# Patient Record
Sex: FEMALE | Race: WHITE | NOT HISPANIC OR LATINO | URBAN - METROPOLITAN AREA
[De-identification: names, ages, dates, MRNs, and addresses within clinical notes are randomized per-mention and may not be internally consistent; named-entity substitution may affect disease eponyms.]

---

## 2018-05-12 ENCOUNTER — EMERGENCY (EMERGENCY)
Facility: HOSPITAL | Age: 53
LOS: 0 days | Discharge: HOME | End: 2018-05-12
Attending: EMERGENCY MEDICINE | Admitting: EMERGENCY MEDICINE
Payer: COMMERCIAL

## 2018-05-12 VITALS
RESPIRATION RATE: 16 BRPM | HEART RATE: 94 BPM | TEMPERATURE: 98 F | SYSTOLIC BLOOD PRESSURE: 128 MMHG | DIASTOLIC BLOOD PRESSURE: 74 MMHG | OXYGEN SATURATION: 99 %

## 2018-05-12 VITALS
DIASTOLIC BLOOD PRESSURE: 79 MMHG | HEIGHT: 66 IN | OXYGEN SATURATION: 96 % | RESPIRATION RATE: 18 BRPM | SYSTOLIC BLOOD PRESSURE: 130 MMHG | WEIGHT: 139.99 LBS | TEMPERATURE: 98 F | HEART RATE: 108 BPM

## 2018-05-12 DIAGNOSIS — L25.9 UNSPECIFIED CONTACT DERMATITIS, UNSPECIFIED CAUSE: ICD-10-CM

## 2018-05-12 DIAGNOSIS — L03.115 CELLULITIS OF RIGHT LOWER LIMB: ICD-10-CM

## 2018-05-12 DIAGNOSIS — M79.9 SOFT TISSUE DISORDER, UNSPECIFIED: ICD-10-CM

## 2018-05-12 LAB
ALBUMIN SERPL ELPH-MCNC: 4 G/DL — SIGNIFICANT CHANGE UP (ref 3.3–5)
ALP SERPL-CCNC: 59 U/L — SIGNIFICANT CHANGE UP (ref 40–120)
ALT FLD-CCNC: 28 U/L — SIGNIFICANT CHANGE UP (ref 12–78)
ANION GAP SERPL CALC-SCNC: 9 MMOL/L — SIGNIFICANT CHANGE UP (ref 5–17)
AST SERPL-CCNC: 24 U/L — SIGNIFICANT CHANGE UP (ref 15–37)
BASOPHILS # BLD AUTO: 0.05 K/UL — SIGNIFICANT CHANGE UP (ref 0–0.2)
BASOPHILS NFR BLD AUTO: 0.7 % — SIGNIFICANT CHANGE UP (ref 0–2)
BILIRUB SERPL-MCNC: 0.7 MG/DL — SIGNIFICANT CHANGE UP (ref 0.2–1.2)
BUN SERPL-MCNC: 23 MG/DL — SIGNIFICANT CHANGE UP (ref 7–23)
CALCIUM SERPL-MCNC: 8.7 MG/DL — SIGNIFICANT CHANGE UP (ref 8.5–10.1)
CHLORIDE SERPL-SCNC: 104 MMOL/L — SIGNIFICANT CHANGE UP (ref 96–108)
CO2 SERPL-SCNC: 26 MMOL/L — SIGNIFICANT CHANGE UP (ref 22–31)
CREAT SERPL-MCNC: 1.12 MG/DL — SIGNIFICANT CHANGE UP (ref 0.5–1.3)
EOSINOPHIL # BLD AUTO: 0.26 K/UL — SIGNIFICANT CHANGE UP (ref 0–0.5)
EOSINOPHIL NFR BLD AUTO: 3.5 % — SIGNIFICANT CHANGE UP (ref 0–6)
GLUCOSE SERPL-MCNC: 84 MG/DL — SIGNIFICANT CHANGE UP (ref 70–99)
HCT VFR BLD CALC: 41.1 % — SIGNIFICANT CHANGE UP (ref 34.5–45)
HGB BLD-MCNC: 13.5 G/DL — SIGNIFICANT CHANGE UP (ref 11.5–15.5)
IMM GRANULOCYTES NFR BLD AUTO: 0.4 % — SIGNIFICANT CHANGE UP (ref 0–1.5)
LYMPHOCYTES # BLD AUTO: 1.43 K/UL — SIGNIFICANT CHANGE UP (ref 1–3.3)
LYMPHOCYTES # BLD AUTO: 19.2 % — SIGNIFICANT CHANGE UP (ref 13–44)
MCHC RBC-ENTMCNC: 30.7 PG — SIGNIFICANT CHANGE UP (ref 27–34)
MCHC RBC-ENTMCNC: 32.8 GM/DL — SIGNIFICANT CHANGE UP (ref 32–36)
MCV RBC AUTO: 93.4 FL — SIGNIFICANT CHANGE UP (ref 80–100)
MONOCYTES # BLD AUTO: 0.58 K/UL — SIGNIFICANT CHANGE UP (ref 0–0.9)
MONOCYTES NFR BLD AUTO: 7.8 % — SIGNIFICANT CHANGE UP (ref 2–14)
NEUTROPHILS # BLD AUTO: 5.1 K/UL — SIGNIFICANT CHANGE UP (ref 1.8–7.4)
NEUTROPHILS NFR BLD AUTO: 68.4 % — SIGNIFICANT CHANGE UP (ref 43–77)
NRBC # BLD: 0 /100 WBCS — SIGNIFICANT CHANGE UP (ref 0–0)
PLATELET # BLD AUTO: 247 K/UL — SIGNIFICANT CHANGE UP (ref 150–400)
POTASSIUM SERPL-MCNC: 3.8 MMOL/L — SIGNIFICANT CHANGE UP (ref 3.5–5.3)
POTASSIUM SERPL-SCNC: 3.8 MMOL/L — SIGNIFICANT CHANGE UP (ref 3.5–5.3)
PROT SERPL-MCNC: 7 GM/DL — SIGNIFICANT CHANGE UP (ref 6–8.3)
RBC # BLD: 4.4 M/UL — SIGNIFICANT CHANGE UP (ref 3.8–5.2)
RBC # FLD: 13.2 % — SIGNIFICANT CHANGE UP (ref 10.3–14.5)
SODIUM SERPL-SCNC: 139 MMOL/L — SIGNIFICANT CHANGE UP (ref 135–145)
WBC # BLD: 7.45 K/UL — SIGNIFICANT CHANGE UP (ref 3.8–10.5)
WBC # FLD AUTO: 7.45 K/UL — SIGNIFICANT CHANGE UP (ref 3.8–10.5)

## 2018-05-12 PROCEDURE — 99285 EMERGENCY DEPT VISIT HI MDM: CPT

## 2018-05-12 RX ADMIN — Medication 100 MILLIGRAM(S): at 09:28

## 2018-05-12 NOTE — ED ADULT NURSE NOTE - OBJECTIVE STATEMENT
Pt with 2 wk old poison oak, with cellulitis since Wednesday. Rash and redness on both thighs and arms.

## 2018-05-12 NOTE — ED ADULT TRIAGE NOTE - CHIEF COMPLAINT QUOTE
Pt reports poison ivy/poison oak first noticed approx a week and a half ago. Pt reports that she has developed cellulitis on RLE with some possible cellulitis on left thigh.  Pt told to come to ED if symptoms worsened, with spreading redness today.

## 2018-05-28 ENCOUNTER — TRANSCRIPTION ENCOUNTER (OUTPATIENT)
Age: 53
End: 2018-05-28

## 2020-03-22 NOTE — ED PROVIDER NOTE - OBJECTIVE STATEMENT
53 y/o F with no pertinent PMHx presents to the ED c/o worsening redness and swelling on RLE and left thigh. These areas has been previously exposed to poison ivy a week prior. Pt was started on Bactrim yesterday for secondary infection of contact dermatitis from two weeks ago.
No

## 2022-05-21 ENCOUNTER — NON-APPOINTMENT (OUTPATIENT)
Age: 57
End: 2022-05-21

## 2022-08-31 PROBLEM — Z00.00 ENCOUNTER FOR PREVENTIVE HEALTH EXAMINATION: Status: ACTIVE | Noted: 2022-08-31

## 2022-09-14 ENCOUNTER — APPOINTMENT (OUTPATIENT)
Dept: ORTHOPEDIC SURGERY | Facility: CLINIC | Age: 57
End: 2022-09-14

## 2022-09-14 ENCOUNTER — TRANSCRIPTION ENCOUNTER (OUTPATIENT)
Age: 57
End: 2022-09-14

## 2022-09-14 VITALS — BODY MASS INDEX: 22.5 KG/M2 | HEIGHT: 66 IN | WEIGHT: 140 LBS

## 2022-09-14 DIAGNOSIS — Z78.9 OTHER SPECIFIED HEALTH STATUS: ICD-10-CM

## 2022-09-14 DIAGNOSIS — M51.16 INTERVERTEBRAL DISC DISORDERS WITH RADICULOPATHY, LUMBAR REGION: ICD-10-CM

## 2022-09-14 DIAGNOSIS — M54.16 RADICULOPATHY, LUMBAR REGION: ICD-10-CM

## 2022-09-14 PROCEDURE — 99204 OFFICE O/P NEW MOD 45 MIN: CPT

## 2022-09-14 RX ORDER — METHYLPREDNISOLONE 4 MG/1
4 TABLET ORAL
Qty: 1 | Refills: 1 | Status: ACTIVE | COMMUNITY
Start: 2022-09-14 | End: 1900-01-01

## 2022-09-14 NOTE — HISTORY OF PRESENT ILLNESS
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 - down the side of the right leg to the knee - left leg is okay -no injury but had hurt her knee and then she was "walking funny" - very distant hx of back issues when she was 21 \par \par pt has been seen  a chiropractor which it has helped a bit \par Tried some advil - no scirpt\par No accupuncture/PT \par No injections \par No MRI \par \par xrays reviewed from [facility]\par L spine -DS at L4-5, loss of disc hieght at L5-S1 \par \par No loss of bb control \par No hx of cancer \par No prior spinal surgery  [] : no [FreeTextEntry1] : lower spine  [FreeTextEntry5] : no injury  [FreeTextEntry9] : chiro care  [de-identified] : stretching,with activity [de-identified] : x rays done at [facility]  [de-identified] : chiro care

## 2022-09-14 NOTE — DISCUSSION/SUMMARY
[de-identified] : DS at l4-5 with loss of disc hieght AT l5-s1 \par DOING BETTER WITH CHIRO WOULD CONTINUE WITH THAT \par mdp \par IF NOT GETTING BETTER WILL CALL AND GET AN mri OF THE l SPINE WITHOUT CONTRAST

## 2023-09-06 ENCOUNTER — NON-APPOINTMENT (OUTPATIENT)
Age: 58
End: 2023-09-06

## 2024-05-09 ENCOUNTER — APPOINTMENT (OUTPATIENT)
Dept: ORTHOPEDIC SURGERY | Facility: CLINIC | Age: 59
End: 2024-05-09
Payer: OTHER MISCELLANEOUS

## 2024-05-09 VITALS — HEIGHT: 66 IN | WEIGHT: 140 LBS | BODY MASS INDEX: 22.5 KG/M2

## 2024-05-09 DIAGNOSIS — M47.816 SPONDYLOSIS W/OUT MYELOPATHY OR RADICULOPATHY, LUMBAR REGION: ICD-10-CM

## 2024-05-09 DIAGNOSIS — M51.26 OTHER INTERVERTEBRAL DISC DISPLACEMENT, LUMBAR REGION: ICD-10-CM

## 2024-05-09 DIAGNOSIS — M43.16 SPONDYLOLISTHESIS, LUMBAR REGION: ICD-10-CM

## 2024-05-09 PROCEDURE — 73562 X-RAY EXAM OF KNEE 3: CPT | Mod: LT

## 2024-05-09 PROCEDURE — 99203 OFFICE O/P NEW LOW 30 MIN: CPT | Mod: 25

## 2024-05-09 PROCEDURE — L1833: CPT | Mod: LT

## 2024-05-09 NOTE — HISTORY OF PRESENT ILLNESS
[Lower back] : lower back [Work related] : work related [7] : 7 [2] : 2 [Dull/Aching] : dull/aching [Sharp] : sharp [Stabbing] : stabbing [] : yes [Constant] : constant [Household chores] : household chores [Work] : work [Meds] : meds [Ice] : ice [Sitting] : sitting [Walking] : walking [Exercising] : exercising [Stairs] : stairs [Full time] : Work status: full time [de-identified] : CARLEY NP - WC DOI 5/8/24:   5/9/24  Initial visit for this 58 year old female   who missed a step at school on 5/8/24 where she injured her lt knee and lower back. C/o persistent lt knee pain since . Limping. Using a cane. Has bee OOW since the injury.  PMH: s/p arthroscopic menisectomy lt knee > 10 years ago. Has done well since.             Hx of LB issues x last 2 years and was evaluated by spine surgeon. [FreeTextEntry1] : left knee, lower back [FreeTextEntry3] : 5/8/24 [FreeTextEntry5] : Patient was walking down the stairs and slipped/ missed the last step and fell on her back with her left leg was bent backwards. She ended up laid down on the floor with cuts on her leg. [FreeTextEntry6] : sharp, stabbing pain when walking [FreeTextEntry7] : down left shin and across back of leg [FreeTextEntry9] : tylenol [de-identified] : None [de-identified] :

## 2024-05-09 NOTE — WORK
[Sprain/Strain] : sprain/strain [Was the competent medical cause of the injury] : was the competent medical cause of the injury [Are consistent with the injury] : are consistent with the injury [Total (100%)] : total (100%) [Does not reveal pre-existing condition(s) that may affect treatment/prognosis] : does not reveal pre-existing condition(s) that may affect treatment/prognosis [Cannot return to work because ________] : cannot return to work because [unfilled] [I provided the services listed above] :  I provided the services listed above. [FreeTextEntry1] : Uncertain none

## 2024-05-09 NOTE — PLAN
[TextEntry] : The patient was advised of the diagnosis. The natural history of the pathology was explained in full to the patient in layman's terms. All questions were answered. The risks and benefits of surgical and non-surgical treatment alternatives were explained in full to the patient.  Will obtain an MRI lt knee  Patient may weightbear as tolerated. The patient was instructed on the importance of ice and elevation of the extremity to decrease swelling and pain. Fitted with double hinged knee brace. Will be OOW until 5/14/24.

## 2024-05-09 NOTE — PHYSICAL EXAM
[Able to Communicate] : able to communicate [Well Developed] : well developed [Well Nourished] : well nourished [NL (0)] : extension 0 degrees [5___] : hamstring 5[unfilled]/5 [NL (90)] : forward flexion 90 degrees [NL (30)] : right lateral bending 30 degrees [Bending to right] : bending to right [Left] : left knee [AP] : anteroposterior [Lateral] : lateral [[place]] : skyline [There are no fractures, subluxations or dislocations. No significant abnormalities are seen] : There are no fractures, subluxations or dislocations. No significant abnormalities are seen [de-identified] : thin [] : negative Lachmann [TWNoteComboBox7] : flexion 130 degrees

## 2024-05-16 ENCOUNTER — APPOINTMENT (OUTPATIENT)
Dept: MRI IMAGING | Facility: CLINIC | Age: 59
End: 2024-05-16
Payer: OTHER MISCELLANEOUS

## 2024-05-16 PROCEDURE — 73721 MRI JNT OF LWR EXTRE W/O DYE: CPT | Mod: LT

## 2024-05-17 ENCOUNTER — TRANSCRIPTION ENCOUNTER (OUTPATIENT)
Age: 59
End: 2024-05-17

## 2024-05-22 ENCOUNTER — NON-APPOINTMENT (OUTPATIENT)
Age: 59
End: 2024-05-22

## 2024-05-22 ENCOUNTER — TRANSCRIPTION ENCOUNTER (OUTPATIENT)
Age: 59
End: 2024-05-22

## 2024-05-23 ENCOUNTER — APPOINTMENT (OUTPATIENT)
Dept: ORTHOPEDIC SURGERY | Facility: CLINIC | Age: 59
End: 2024-05-23
Payer: OTHER MISCELLANEOUS

## 2024-05-23 PROCEDURE — 99214 OFFICE O/P EST MOD 30 MIN: CPT | Mod: 25

## 2024-05-23 PROCEDURE — 27781 TREATMENT OF FIBULA FRACTURE: CPT | Mod: LT

## 2024-05-23 PROCEDURE — 27508 TREATMENT OF THIGH FRACTURE: CPT | Mod: LT

## 2024-05-23 PROCEDURE — 73562 X-RAY EXAM OF KNEE 3: CPT | Mod: LT

## 2024-05-23 NOTE — HISTORY OF PRESENT ILLNESS
[Lower back] : lower back [Work related] : work related [7] : 7 [2] : 2 [Dull/Aching] : dull/aching [Sharp] : sharp [Stabbing] : stabbing [] : yes [Constant] : constant [Household chores] : household chores [Work] : work [Meds] : meds [Ice] : ice [Sitting] : sitting [Walking] : walking [Exercising] : exercising [Stairs] : stairs [Full time] : Work status: full time [de-identified] :  DOI 5/8/24 05/23/24:   F/U.  Here today for left knee MRI results.  Is now 15 days after work related injury.  Now has developed bruising down her shin to her ankle.  States her ankle was swollen also until today.  Using a crutch for support.  Wears the knee brace but does take it off when sitting because it presses on the area of her pain.  IMPRESSION:  LEFT 1.  Nondepressed subchondral fracture of the weightbearing of the lateral femur and posterior weightbearing of the lateral femur with diffuse marrow edema. 2.  Nondisplaced nondepressed fracture of the fibular head extending into the medullary cavity with diffuse marrow edema. 3.  Diffuse soft tissue edema with joint effusion. 4.  Medial meniscal tear. 5.  Intramedullary fracture of the medial patella with marrow edema but no definitive intraarticular extension.  5/9/24  Initial visit for this 58 year old female   who missed a step at school on 5/8/24 where she injured her lt knee and lower back. C/o persistent lt knee pain since . Limping. Using a cane. Has bee OOW since the injury.  PMH: s/p arthroscopic meniscectomy lt knee > 10 years ago. Has done well since.             Hx of LB issues x last 2 years and was evaluated by spine surgeon. [FreeTextEntry1] : left knee, lower back [FreeTextEntry3] : 5/8/24 [FreeTextEntry5] : Patient was walking down the stairs and slipped/ missed the last step and fell on her back with her left leg was bent backwards. She ended up laid down on the floor with cuts on her leg. [FreeTextEntry6] : sharp, stabbing pain when walking [FreeTextEntry7] : down left shin and across back of leg [FreeTextEntry9] : tylenol [de-identified] : None [de-identified] :

## 2024-05-23 NOTE — PLAN
[TextEntry] : The patient was advised of the diagnosis. The natural history of the pathology was explained in full to the patient in layman's terms. All questions were answered. The risks and benefits of surgical and non-surgical treatment alternatives were explained in full to the patient.  Continue crutches for now  PWB lt leg only.  Remains OOW, totally disabled.

## 2024-05-23 NOTE — PHYSICAL EXAM
[Able to Communicate] : able to communicate [Well Developed] : well developed [Well Nourished] : well nourished [NL (90)] : forward flexion 90 degrees [NL (30)] : right lateral bending 30 degrees [Bending to right] : bending to right [NL (0)] : extension 0 degrees [5___] : hamstring 5[unfilled]/5 [Left] : left knee [AP] : anteroposterior [Lateral] : lateral [[place]] : skyline [There are no fractures, subluxations or dislocations. No significant abnormalities are seen] : There are no fractures, subluxations or dislocations. No significant abnormalities are seen [de-identified] : thin [] : negative Lachmann [FreeTextEntry3] : Bruising tracking down shin to left ankle. [TWNoteComboBox7] : flexion 130 degrees

## 2024-05-23 NOTE — WORK
[Sprain/Strain] : sprain/strain [Was the competent medical cause of the injury] : was the competent medical cause of the injury [Are consistent with the injury] : are consistent with the injury [Total (100%)] : total (100%) [Does not reveal pre-existing condition(s) that may affect treatment/prognosis] : does not reveal pre-existing condition(s) that may affect treatment/prognosis [Cannot return to work because ________] : cannot return to work because [unfilled] [I provided the services listed above] :  I provided the services listed above. [FreeTextEntry1] : Uncertain

## 2024-06-04 ENCOUNTER — APPOINTMENT (OUTPATIENT)
Dept: ORTHOPEDIC SURGERY | Facility: CLINIC | Age: 59
End: 2024-06-04
Payer: OTHER MISCELLANEOUS

## 2024-06-04 VITALS — BODY MASS INDEX: 22.5 KG/M2 | WEIGHT: 140 LBS | HEIGHT: 66 IN

## 2024-06-04 DIAGNOSIS — Z78.9 OTHER SPECIFIED HEALTH STATUS: ICD-10-CM

## 2024-06-04 DIAGNOSIS — M84.452A PATHOLOGICAL FRACTURE, LEFT FEMUR, INITIAL ENCOUNTER FOR FRACTURE: ICD-10-CM

## 2024-06-04 DIAGNOSIS — S82.832A OTHER FRACTURE OF UPPER AND LOWER END OF LEFT FIBULA, INITIAL ENCOUNTER FOR CLOSED FRACTURE: ICD-10-CM

## 2024-06-04 DIAGNOSIS — S82.832D OTHER FRACTURE OF UPPER AND LOWER END OF LEFT FIBULA, SUBSEQUENT ENCOUNTER FOR CLOSED FRACTURE WITH ROUTINE HEALING: ICD-10-CM

## 2024-06-04 DIAGNOSIS — M23.92 UNSPECIFIED INTERNAL DERANGEMENT OF LEFT KNEE: ICD-10-CM

## 2024-06-04 DIAGNOSIS — M84.452D PATHOLOGICAL FRACTURE, LEFT FEMUR, SUBSEQUENT ENCOUNTER FOR FRACTURE WITH ROUTINE HEALING: ICD-10-CM

## 2024-06-04 PROCEDURE — 73562 X-RAY EXAM OF KNEE 3: CPT | Mod: LT

## 2024-06-04 PROCEDURE — 99024 POSTOP FOLLOW-UP VISIT: CPT

## 2024-06-04 NOTE — WORK
[Sprain/Strain] : sprain/strain [Was the competent medical cause of the injury] : was the competent medical cause of the injury [Are consistent with the injury] : are consistent with the injury [Total (100%)] : total (100%) [Does not reveal pre-existing condition(s) that may affect treatment/prognosis] : does not reveal pre-existing condition(s) that may affect treatment/prognosis [I provided the services listed above] :  I provided the services listed above. [Can return to work with limitations on ______] : can return to work with limitations on [unfilled] [Climbing stairs/Ladders] : climbing stairs/ladders [FreeTextEntry1] : Uncertain

## 2024-06-04 NOTE — PLAN
[TextEntry] : The patient was advised of the diagnosis. The natural history of the pathology was explained in full to the patient in layman's terms. All questions were answered. The risks and benefits of surgical and non-surgical treatment alternatives were explained in full to the patient.  Patient may weightbear as tolerated with knee brace  RTW as of 6/5/24 with limitations .She cannot do any prolonged stairclimbing at work.

## 2024-06-04 NOTE — HISTORY OF PRESENT ILLNESS
[Lower back] : lower back [Work related] : work related [7] : 7 [Constant] : constant [Household chores] : household chores [Work] : work [Meds] : meds [Ice] : ice [Sitting] : sitting [Walking] : walking [Exercising] : exercising [Stairs] : stairs [0] : 0 [Throbbing] : throbbing [Not working due to injury] : Work status: not working due to injury [de-identified] :  DOI 5/8/24 06/04/24:  WC F/U.  Is nearly one month after left knee injury at work. Doing much better. No longer c/o lt ankle swelling. Able to WBAT with knee brace. Still OOW for now.  05/23/24:  WC F/U.  Here today for left knee MRI results.  Is now 15 days after work related injury.  Now has developed bruising down her shin to her ankle.  States her ankle was swollen also until today.  Using a crutch for support.  Wears the knee brace but does take it off when sitting because it presses on the area of her pain.  IMPRESSION:  LEFT 1.  Nondepressed subchondral fracture of the weightbearing of the lateral femur and posterior weightbearing of the lateral femur with diffuse marrow edema. 2.  Nondisplaced nondepressed fracture of the fibular head extending into the medullary cavity with diffuse marrow edema. 3.  Diffuse soft tissue edema with joint effusion. 4.  Medial meniscal tear. 5.  Intramedullary fracture of the medial patella with marrow edema but no definitive intraarticular extension.  5/9/24  Initial visit for this 58 year old female   who missed a step at school on 5/8/24 where she injured her lt knee and lower back. C/o persistent lt knee pain since . Limping. Using a cane. Has bee OOW since the injury.  PMH: s/p arthroscopic meniscectomy lt knee > 10 years ago. Has done well since.             Hx of LB issues x last 2 years and was evaluated by spine surgeon. [] : no [FreeTextEntry1] : left knee, lower back [FreeTextEntry3] : 5/8/24 [FreeTextEntry5] : Patient was walking down the stairs and slipped/ missed the last step and fell on her back with her left leg was bent backwards. She ended up laid down on the floor with cuts on her leg. [FreeTextEntry6] : sharp, stabbing pain when walking [FreeTextEntry7] : down left shin and across back of leg [FreeTextEntry9] : tylenol [de-identified] : 5/23/24 [de-identified] : Dr Plaza [de-identified] :  [de-identified] : None

## 2024-06-04 NOTE — PHYSICAL EXAM
[Able to Communicate] : able to communicate [Well Developed] : well developed [Well Nourished] : well nourished [NL (90)] : forward flexion 90 degrees [NL (30)] : right lateral bending 30 degrees [Bending to right] : bending to right [NL (0)] : extension 0 degrees [5___] : hamstring 5[unfilled]/5 [Left] : left knee [AP] : anteroposterior [Lateral] : lateral [[place]] : skyline [There are no fractures, subluxations or dislocations. No significant abnormalities are seen] : There are no fractures, subluxations or dislocations. No significant abnormalities are seen [NL (140)] : flexion 140 degrees [de-identified] : thin [] : non-antalgic [FreeTextEntry3] : Bruising tracking down shin to left ankle.

## 2024-07-09 ENCOUNTER — APPOINTMENT (OUTPATIENT)
Dept: ORTHOPEDIC SURGERY | Facility: CLINIC | Age: 59
End: 2024-07-09
Payer: OTHER MISCELLANEOUS

## 2024-07-09 DIAGNOSIS — S82.832D OTHER FRACTURE OF UPPER AND LOWER END OF LEFT FIBULA, SUBSEQUENT ENCOUNTER FOR CLOSED FRACTURE WITH ROUTINE HEALING: ICD-10-CM

## 2024-07-09 DIAGNOSIS — M84.452D PATHOLOGICAL FRACTURE, LEFT FEMUR, SUBSEQUENT ENCOUNTER FOR FRACTURE WITH ROUTINE HEALING: ICD-10-CM

## 2024-07-09 DIAGNOSIS — M23.92 UNSPECIFIED INTERNAL DERANGEMENT OF LEFT KNEE: ICD-10-CM

## 2024-07-09 PROCEDURE — 99213 OFFICE O/P EST LOW 20 MIN: CPT | Mod: 24

## 2025-03-20 ENCOUNTER — APPOINTMENT (OUTPATIENT)
Dept: ORTHOPEDIC SURGERY | Facility: CLINIC | Age: 60
End: 2025-03-20

## 2025-03-20 VITALS — HEIGHT: 66 IN | BODY MASS INDEX: 22.5 KG/M2 | WEIGHT: 140 LBS

## 2025-03-20 DIAGNOSIS — M76.899 OTHER SPECIFIED ENTHESOPATHIES OF UNSPECIFIED LOWER LIMB, EXCLUDING FOOT: ICD-10-CM

## 2025-03-20 PROCEDURE — 99212 OFFICE O/P EST SF 10 MIN: CPT | Mod: 25

## 2025-03-20 PROCEDURE — 73562 X-RAY EXAM OF KNEE 3: CPT | Mod: RT
